# Patient Record
Sex: FEMALE | Race: WHITE
[De-identification: names, ages, dates, MRNs, and addresses within clinical notes are randomized per-mention and may not be internally consistent; named-entity substitution may affect disease eponyms.]

---

## 2018-10-23 ENCOUNTER — HOSPITAL ENCOUNTER (EMERGENCY)
Dept: HOSPITAL 91 - FTE | Age: 38
Discharge: HOME | End: 2018-10-23
Payer: MEDICAID

## 2018-10-23 ENCOUNTER — HOSPITAL ENCOUNTER (EMERGENCY)
Age: 38
Discharge: HOME | End: 2018-10-23

## 2018-10-23 DIAGNOSIS — K59.00: Primary | ICD-10-CM

## 2018-10-23 PROCEDURE — 99282 EMERGENCY DEPT VISIT SF MDM: CPT

## 2019-08-11 ENCOUNTER — HOSPITAL ENCOUNTER (EMERGENCY)
Dept: HOSPITAL 91 - FTE | Age: 39
LOS: 1 days | Discharge: HOME | End: 2019-08-12
Payer: MEDICAID

## 2019-08-11 ENCOUNTER — HOSPITAL ENCOUNTER (EMERGENCY)
Dept: HOSPITAL 10 - FTE | Age: 39
LOS: 1 days | Discharge: HOME | End: 2019-08-12
Payer: MEDICAID

## 2019-08-11 VITALS
HEIGHT: 64 IN | HEIGHT: 64 IN | BODY MASS INDEX: 29.02 KG/M2 | BODY MASS INDEX: 29.02 KG/M2 | WEIGHT: 169.98 LBS | WEIGHT: 169.98 LBS

## 2019-08-11 DIAGNOSIS — N39.0: Primary | ICD-10-CM

## 2019-08-11 PROCEDURE — 99283 EMERGENCY DEPT VISIT LOW MDM: CPT

## 2019-08-11 PROCEDURE — 81003 URINALYSIS AUTO W/O SCOPE: CPT

## 2019-08-11 PROCEDURE — 81025 URINE PREGNANCY TEST: CPT

## 2019-08-12 VITALS — RESPIRATION RATE: 16 BRPM | SYSTOLIC BLOOD PRESSURE: 146 MMHG | DIASTOLIC BLOOD PRESSURE: 92 MMHG | HEART RATE: 102 BPM

## 2019-08-12 LAB
URINE BLOOD (DIP) POC: (no result)
URINE LEUKOCYTE EST (DIP) POC: (no result)
URINE PH (DIP) POC: 6 (ref 5–8.5)
URINE TOTAL PROTEIN POC: (no result)

## 2019-08-12 RX ADMIN — IBUPROFEN 1 MG: 600 TABLET ORAL at 00:35

## 2019-08-12 NOTE — ERD
ER Documentation


Chief Complaint


Chief Complaint





DYSURIA AND HEMATURIA X2DAYS; DENIES FLANK PAIN AT THIS TIME





HPI


Patient is a 39-year-old female, presents the ER for concerns of dysuria, u


rinary frequency and hematuria for the last 2 days.  Patient denies any fevers 


or chills.  Patient denies any nausea or vomiting.  Patient denies any flank 


pain.  Patient states her last menstrual period was July 13, 2019.





ROS


All systems reviewed and are negative except as per history of present illness.





Medications


Home Meds


Active Scripts


Nitrofurantoin Monohyd Macrocr* (Macrobid*) 100 Mg Capsr, 100 MG PO BID for 5 


Days, CAP


   Prov:ISRAEL FREDERICK PA-C         8/12/19


Magnesium Hydroxide* (Milk Of Magnesia*) 400 Mg/5 Ml Oral.susp, 30 ML PO BID PRN


for constipation, #1 BOTTLE


   Prov:LAYLA MONTGOMERY DO         10/23/18


Docusate Sodium* (Colace*) 100 Mg Capsule, 100 MG PO BID PRN for CONSTIPATION 


for 10 Days, #30 CAP


   Prov:LAYLA MONTGOMERY DO         10/23/18


Cephalexin* (Keflex*) 500 Mg Capsule, 500 MG PO TID for 10 Days, CAP


   Prov:DIGNA VILLALPANDO PA-C         4/13/18


Ondansetron (Ondansetron Odt) 4 Mg Tab.rapdis, 4 MG PO Q6H PRN for NAUSEA AND/OR


VOMITING, #10 TAB


   Prov:DIGNA VILLALPANDO PA-C         4/13/18


Ranitidine Hcl* (Zantac*) 150 Mg Tablet, 150 MG PO BID PRN for EPIGASTRIC PAIN, 


#30 TAB


   Prov:DIGNA VILLALPANDO PA-C         4/13/18


Hydrocodone/Acetaminophen (Norco 5-325 Tablet) 1 Each Tablet, 1 TAB PO Q6H PRN 


for PAIN, #7 TAB


   Prov:DIGNA VILLALPANDO PA-C         4/13/18





Allergies


Allergies:  


Coded Allergies:  


     No Known Allergy (Unverified , 10/23/18)





PMhx/Soc


History of Surgery:  Yes (APPENDECTOMY)


Anesthesia Reaction:  No


Hx Neurological Disorder:  No


Hx Respiratory Disorders:  No


Hx Cardiac Disorders:  No


Hx Psychiatric Problems:  No


Hx Miscellaneous Medical Probl:  Yes (HYPERLIPIDEMIA)


Hx Alcohol Use:  No


Hx Substance Use:  No


Hx Tobacco Use:  No


Smoking Status:  Never smoker





FmHx


Family History:  No diabetes





Physical Exam


Vitals





Vital Signs


  Date      Temp  Pulse  Resp  B/P (MAP)   Pulse Ox  O2          O2 Flow    FiO2


Time                                                 Delivery    Rate


   8/11/19  99.6    117    19      138/85       100


     22:13                          (102)





Physical Exam


GENERAL: Well-developed, well-nourished female. Appears in no acute distress. 


HEAD: Normocephalic, atraumatic. 


EYES: Pupils are equally reactive bilaterally. EOMs grossly intact. No 


conjunctival erythema. 


NECK: Supple. No meningismus. Normal range of motion of the neck.


LUNG: Clear to auscultation bilaterally. No rhonchi, wheezing, rales or coarse 


breath sounds. 


HEART: Regular rate and rhythm. No murmurs, rubs or gallops.


ABDOMEN: Soft nondistended.  Tender to palpation in the suprapubic region.  


Positive bowel sounds in all four quadrants. No rebound tenderness, no guarding.


(-) McBurney's point tenderness. No CVA tenderness.


EXTREMITIES: Equal pulses bilaterally. No peripheral clubbing, cyanosis or 


edema. No unilateral leg swelling.


NEUROLOGIC: Alert and oriented. Moving all four extremities without any 


difficulty. Normal speech. Steady gait. 


SKIN: Normal color. Warm and dry. No rashes or lesions.


Results 24 hrs





Laboratory Tests


               Test
                                8/12/19
00:07


               Bedside Urine pH (LAB)                        6.0


               Bedside Urine Protein (LAB)                    2+


               Bedside Urine Glucose (UA)           Negative


               Bedside Urine Ketones (LAB)          Negative


               Bedside Urine Blood                            3+


               Bedside Urine Nitrite (LAB)          Negative


               Bedside Urine Leukocyte
Esterase (L           1+ 



               POC Beta HCG, Qualitative            NEGATIVE








Procedures/MDM


MEDICAL DECISION MAKING:


This is a 39-year-old female presents ER for concerns of dysuria, urinary 


frequency and hematuria for the last 2 days.  Vital signs were reviewed. Patient


was afebrile. UA showed 1+ leukocyte esterase and positive blood.  Urine 


pregnancy was negative. Given these findings, the patients presentation is most 


consistent with urinary tract infection. I have a much lower clinical concern 


for pyelonephritis, nephrolithiasis, appendicitis, diverticulitis, constipation,


ectopic pregnancy, PID, ovarian torsion, or tubo-ovarian abscess.  She was 


nontoxic, non-ill-appearing prior to discharge.





PRESCRIPTIONS:


Macrobid





DISCHARGE:


At this time, patient is stable for discharge and outpatient management. I have 


instructed the patient to follow-up with his/her primary care physician in 1-2 


days. Patient should repeat UA in 2 weeks to check for resolution of urinary 


tract infection. If symptoms persist, patient may need to see a specialist for 


further examinations and testing. I have instructed the patient to promptly 


return to the ER at any time for any new or worsening symptoms including 


increased pain, fever, nausea, vomiting, urinary changes or weakness. The 


patient and/or family expressed understanding of and agreement with this plan. 


All questions were answered. Home care instructions were provided. 








Disclaimer: Inadvertent spelling and grammatical errors are likely due to 


EHR/dictation software use and do not reflect on the overall quality of patient 


care. Also, please note that the electronic time recorded on this note does not 


necessarily reflect the actual time of the patient encounter.





Departure


Diagnosis:  


   Primary Impression:  


   UTI (urinary tract infection)


   Urinary tract infection type:  site unspecified  Hematuria presence:  with 


   hematuria  Qualified Codes:  N39.0 - Urinary tract infection, site not 


   specified; R31.9 - Hematuria, unspecified


Condition:  Fair


Patient Instructions:  Understanding Urinary Tract Infections (UTIs)


Referrals:  


Critical access hospital CLINICS


YOU HAVE RECEIVED A MEDICAL SCREENING EXAM AND THE RESULTS INDICATE THAT YOU DO 


NOT HAVE A CONDITION THAT REQUIRES URGENT TREATMENT IN THE EMERGENCY DEPARTMENT.





FURTHER EVALUATION AND TREATMENT OF YOUR CONDITION CAN WAIT UNTIL YOU ARE SEEN 


IN YOUR DOCTORS OFFICE WITHIN THE NEXT 1-2 DAYS. IT IS YOUR RESPONSIBILITY TO 


MAKE AN APPOINTMENT FOR FOLOW-UP CARE.





IF YOU HAVE A PRIMARY DOCTOR


--you should call your primary doctor and schedule an appointment





IF YOU DO NOT HAVE A PRIMARY DOCTOR YOU CAN CALL OUR PHYSICIAN REFERRAL HOTLINE 


AT


 [phone] 





IF YOU CAN NOT AFFORD TO SEE A PHYSICIAN YOU CAN CHOSE FROM THE FOLLOWING 


Critical access hospital CLINICS





[facility] [phone] [address] [facility] [phone] [address] [facility]. [facility] [phone] [address] [facility] [phone] [address] [facility] [phone] [address] [facility]. [facility]. [phone] [address] [facility]


YOU HAVE RECEIVED A MEDICAL SCREENING EXAM AND THE RESULTS INDICATE THAT YOU DO 


NOT HAVE A CONDITION THAT REQUIRES URGENT TREATMENT IN THE EMERGENCY DEPARTMENT.





FURTHER EVALUATION AND TREATMENT OF YOUR CONDITION CAN WAIT UNTIL YOU ARE SEEN 


IN YOUR DOCTORS OFFICE WITHIN THE NEXT 1-2 DAYS. IT IS YOUR RESPONSIBILITY TO 


MAKE AN APPOINTMENT FOR FOLOW-UP CARE.





IF YOU HAVE A PRIMARY DOCTOR


--you should call your primary doctor and schedule and appointment





IF YOU DO NOT HAVE A PRIMARY DOCTOR YOU CAN CALL OUR PHYSICIAN REFERRAL HOTLINE 


AT [phone].





IF YOU CAN NOT AFFORD TO SEE A PHYSICIAN YOU CAN CHOSE FROM THE FOLLOWING Critical access hospital


INSTITUTIONS:





[facility]


[address] [place] [address]





[facility]


[address] [place] [id number] [place] [address]


[address] [place] [address]





[facility] URGENT CARE/SPECIALTIES





Additional Instructions:  


Call your primary care doctor TOMORROW for an appointment during the next 1-2 


days.See the doctor sooner or return here if your condition worsens before your 


appointment time.











ISRAEL FREDERICK PA-C             Aug 12, 2019 00:14